# Patient Record
Sex: MALE | Employment: FULL TIME | URBAN - METROPOLITAN AREA
[De-identification: names, ages, dates, MRNs, and addresses within clinical notes are randomized per-mention and may not be internally consistent; named-entity substitution may affect disease eponyms.]

---

## 2019-05-24 ENCOUNTER — HOSPITAL ENCOUNTER (OUTPATIENT)
Age: 59
Discharge: HOME | End: 2019-05-26

## 2019-05-24 LAB
HCT VFR BLD CALC: 40.8 % (ref 37–54)
HEMOGLOBIN: 14.3 G/DL (ref 12.5–16.5)
MCH RBC QN AUTO: 29.9 PG (ref 26–35)
MCHC RBC AUTO-ENTMCNC: 35 % (ref 32–34.5)
MCV RBC AUTO: 85.4 FL (ref 80–99.9)
PDW BLD-RTO: 12.8 FL (ref 11.5–15)
PLATELET # BLD: 317 E9/L (ref 130–450)
PMV BLD AUTO: 9.6 FL (ref 7–12)
RBC # BLD: 4.78 E12/L (ref 3.8–5.8)
WBC # BLD: 5.9 E9/L (ref 4.5–11.5)

## 2019-05-24 PROCEDURE — 85027 COMPLETE CBC AUTOMATED: CPT

## 2019-05-24 PROCEDURE — 88304 TISSUE EXAM BY PATHOLOGIST: CPT

## 2019-11-02 ENCOUNTER — HOSPITAL ENCOUNTER (OUTPATIENT)
Age: 59
Discharge: HOME | End: 2019-11-02
Payer: COMMERCIAL

## 2019-11-02 LAB
ALBUMIN SERPL-MCNC: 4.7 G/DL (ref 3.5–5.2)
ALP BLD-CCNC: 73 U/L (ref 40–129)
ALT SERPL-CCNC: 14 U/L (ref 0–40)
ANION GAP SERPL CALCULATED.3IONS-SCNC: 12 MMOL/L (ref 7–16)
AST SERPL-CCNC: 21 U/L (ref 0–39)
BASOPHILS ABSOLUTE: 0.05 E9/L (ref 0–0.2)
BASOPHILS RELATIVE PERCENT: 0.5 % (ref 0–2)
BILIRUB SERPL-MCNC: 0.2 MG/DL (ref 0–1.2)
BUN BLDV-MCNC: 19 MG/DL (ref 6–20)
CALCIUM SERPL-MCNC: 9.9 MG/DL (ref 8.6–10.2)
CHLORIDE BLD-SCNC: 98 MMOL/L (ref 98–107)
CO2: 28 MMOL/L (ref 22–29)
CREAT SERPL-MCNC: 0.9 MG/DL (ref 0.7–1.2)
EOSINOPHILS ABSOLUTE: 0.13 E9/L (ref 0.05–0.5)
EOSINOPHILS RELATIVE PERCENT: 1.2 % (ref 0–6)
GFR AFRICAN AMERICAN: >60
GFR NON-AFRICAN AMERICAN: >60 ML/MIN/1.73
GLUCOSE BLD-MCNC: 89 MG/DL (ref 74–99)
HCT VFR BLD CALC: 43.2 % (ref 37–54)
HEMOGLOBIN: 14.5 G/DL (ref 12.5–16.5)
IMMATURE GRANULOCYTES #: 0.04 E9/L
IMMATURE GRANULOCYTES %: 0.4 % (ref 0–5)
LYMPHOCYTES ABSOLUTE: 2.54 E9/L (ref 1.5–4)
LYMPHOCYTES RELATIVE PERCENT: 23.8 % (ref 20–42)
MCH RBC QN AUTO: 29.1 PG (ref 26–35)
MCHC RBC AUTO-ENTMCNC: 33.6 % (ref 32–34.5)
MCV RBC AUTO: 86.6 FL (ref 80–99.9)
MONOCYTES ABSOLUTE: 0.66 E9/L (ref 0.1–0.95)
MONOCYTES RELATIVE PERCENT: 6.2 % (ref 2–12)
NEUTROPHILS ABSOLUTE: 7.23 E9/L (ref 1.8–7.3)
NEUTROPHILS RELATIVE PERCENT: 67.9 % (ref 43–80)
PDW BLD-RTO: 13 FL (ref 11.5–15)
PLATELET # BLD: 310 E9/L (ref 130–450)
PMV BLD AUTO: 9.1 FL (ref 7–12)
POTASSIUM SERPL-SCNC: 3.8 MMOL/L (ref 3.5–5)
RBC # BLD: 4.99 E12/L (ref 3.8–5.8)
SODIUM BLD-SCNC: 138 MMOL/L (ref 132–146)
TOTAL PROTEIN: 7.4 G/DL (ref 6.4–8.3)
TSH SERPL DL<=0.05 MIU/L-ACNC: 0.43 UIU/ML (ref 0.27–4.2)
WBC # BLD: 10.7 E9/L (ref 4.5–11.5)

## 2019-11-02 PROCEDURE — 86803 HEPATITIS C AB TEST: CPT

## 2019-11-02 PROCEDURE — 84443 ASSAY THYROID STIM HORMONE: CPT

## 2019-11-02 PROCEDURE — 86703 HIV-1/HIV-2 1 RESULT ANTBDY: CPT

## 2019-11-02 PROCEDURE — 36415 COLL VENOUS BLD VENIPUNCTURE: CPT

## 2019-11-02 PROCEDURE — 80053 COMPREHEN METABOLIC PANEL: CPT

## 2019-11-02 PROCEDURE — 85025 COMPLETE CBC W/AUTO DIFF WBC: CPT

## 2019-11-04 LAB
HEPATITIS C ANTIBODY INTERPRETATION: NORMAL
HIV-1 AND HIV-2 ANTIBODIES: NORMAL

## 2020-11-20 ENCOUNTER — APPOINTMENT (OUTPATIENT)
Dept: GENERAL RADIOLOGY | Age: 60
End: 2020-11-20
Payer: COMMERCIAL

## 2020-11-20 ENCOUNTER — HOSPITAL ENCOUNTER (EMERGENCY)
Age: 60
Discharge: HOME | End: 2020-11-20
Attending: EMERGENCY MEDICINE
Payer: COMMERCIAL

## 2020-11-20 VITALS
WEIGHT: 205 LBS | DIASTOLIC BLOOD PRESSURE: 92 MMHG | OXYGEN SATURATION: 100 % | BODY MASS INDEX: 30.36 KG/M2 | HEIGHT: 69 IN | SYSTOLIC BLOOD PRESSURE: 142 MMHG | TEMPERATURE: 97.5 F | HEART RATE: 73 BPM | RESPIRATION RATE: 16 BRPM

## 2020-11-20 LAB
ALBUMIN SERPL-MCNC: 4.4 G/DL (ref 3.5–5.2)
ALP BLD-CCNC: 78 U/L (ref 40–129)
ALT SERPL-CCNC: 20 U/L (ref 0–40)
ANION GAP SERPL CALCULATED.3IONS-SCNC: 11 MMOL/L (ref 7–16)
AST SERPL-CCNC: 22 U/L (ref 0–39)
BASOPHILS ABSOLUTE: 0 E9/L (ref 0–0.2)
BASOPHILS RELATIVE PERCENT: 0 % (ref 0–2)
BILIRUB SERPL-MCNC: 0.5 MG/DL (ref 0–1.2)
BUN BLDV-MCNC: 17 MG/DL (ref 6–20)
CALCIUM SERPL-MCNC: 9.7 MG/DL (ref 8.6–10.2)
CHLORIDE BLD-SCNC: 101 MMOL/L (ref 98–107)
CO2: 26 MMOL/L (ref 22–29)
CREAT SERPL-MCNC: 1.1 MG/DL (ref 0.7–1.2)
EKG ATRIAL RATE: 70 BPM
EKG P AXIS: 68 DEGREES
EKG P-R INTERVAL: 174 MS
EKG Q-T INTERVAL: 400 MS
EKG QRS DURATION: 98 MS
EKG QTC CALCULATION (BAZETT): 432 MS
EKG R AXIS: -7 DEGREES
EKG T AXIS: 48 DEGREES
EKG VENTRICULAR RATE: 70 BPM
EOSINOPHILS ABSOLUTE: 0 E9/L (ref 0.05–0.5)
EOSINOPHILS RELATIVE PERCENT: 0 % (ref 0–6)
GFR AFRICAN AMERICAN: >60
GFR NON-AFRICAN AMERICAN: >60 ML/MIN/1.73
GLUCOSE BLD-MCNC: 114 MG/DL (ref 74–99)
HCT VFR BLD CALC: 44.2 % (ref 37–54)
HEMOGLOBIN: 14.8 G/DL (ref 12.5–16.5)
IMMATURE GRANULOCYTES #: 0.03 E9/L
IMMATURE GRANULOCYTES %: 0.5 % (ref 0–5)
LIPASE: 14 U/L (ref 13–60)
LYMPHOCYTES ABSOLUTE: 1.63 E9/L (ref 1.5–4)
LYMPHOCYTES RELATIVE PERCENT: 24.5 % (ref 20–42)
MCH RBC QN AUTO: 28.7 PG (ref 26–35)
MCHC RBC AUTO-ENTMCNC: 33.5 % (ref 32–34.5)
MCV RBC AUTO: 85.8 FL (ref 80–99.9)
MONOCYTES ABSOLUTE: 0.43 E9/L (ref 0.1–0.95)
MONOCYTES RELATIVE PERCENT: 6.5 % (ref 2–12)
NEUTROPHILS ABSOLUTE: 4.56 E9/L (ref 1.8–7.3)
NEUTROPHILS RELATIVE PERCENT: 68.5 % (ref 43–80)
PDW BLD-RTO: 12.4 FL (ref 11.5–15)
PLATELET # BLD: 262 E9/L (ref 130–450)
PMV BLD AUTO: 8.8 FL (ref 7–12)
POTASSIUM REFLEX MAGNESIUM: 4.1 MMOL/L (ref 3.5–5)
RBC # BLD: 5.15 E12/L (ref 3.8–5.8)
SODIUM BLD-SCNC: 138 MMOL/L (ref 132–146)
TOTAL PROTEIN: 7.2 G/DL (ref 6.4–8.3)
TROPONIN: <0.01 NG/ML (ref 0–0.03)
TROPONIN: <0.01 NG/ML (ref 0–0.03)
WBC # BLD: 6.7 E9/L (ref 4.5–11.5)

## 2020-11-20 PROCEDURE — 74022 RADEX COMPL AQT ABD SERIES: CPT

## 2020-11-20 PROCEDURE — 93005 ELECTROCARDIOGRAM TRACING: CPT | Performed by: STUDENT IN AN ORGANIZED HEALTH CARE EDUCATION/TRAINING PROGRAM

## 2020-11-20 PROCEDURE — 99284 EMERGENCY DEPT VISIT MOD MDM: CPT

## 2020-11-20 PROCEDURE — 2580000003 HC RX 258: Performed by: STUDENT IN AN ORGANIZED HEALTH CARE EDUCATION/TRAINING PROGRAM

## 2020-11-20 PROCEDURE — 85025 COMPLETE CBC W/AUTO DIFF WBC: CPT

## 2020-11-20 PROCEDURE — 6360000002 HC RX W HCPCS: Performed by: STUDENT IN AN ORGANIZED HEALTH CARE EDUCATION/TRAINING PROGRAM

## 2020-11-20 PROCEDURE — 84484 ASSAY OF TROPONIN QUANT: CPT

## 2020-11-20 PROCEDURE — 96374 THER/PROPH/DIAG INJ IV PUSH: CPT

## 2020-11-20 PROCEDURE — 6370000000 HC RX 637 (ALT 250 FOR IP): Performed by: STUDENT IN AN ORGANIZED HEALTH CARE EDUCATION/TRAINING PROGRAM

## 2020-11-20 PROCEDURE — C9113 INJ PANTOPRAZOLE SODIUM, VIA: HCPCS | Performed by: STUDENT IN AN ORGANIZED HEALTH CARE EDUCATION/TRAINING PROGRAM

## 2020-11-20 PROCEDURE — 83690 ASSAY OF LIPASE: CPT

## 2020-11-20 PROCEDURE — 80053 COMPREHEN METABOLIC PANEL: CPT

## 2020-11-20 PROCEDURE — 93010 ELECTROCARDIOGRAM REPORT: CPT | Performed by: INTERNAL MEDICINE

## 2020-11-20 RX ORDER — 0.9 % SODIUM CHLORIDE 0.9 %
1000 INTRAVENOUS SOLUTION INTRAVENOUS ONCE
Status: COMPLETED | OUTPATIENT
Start: 2020-11-20 | End: 2020-11-20

## 2020-11-20 RX ORDER — FLUOXETINE HYDROCHLORIDE 40 MG/1
40 CAPSULE ORAL 2 TIMES DAILY
COMMUNITY
End: 2021-12-16 | Stop reason: ALTCHOICE

## 2020-11-20 RX ORDER — LAMOTRIGINE 200 MG/1
300 TABLET ORAL DAILY
COMMUNITY

## 2020-11-20 RX ORDER — HYDROXYZINE HYDROCHLORIDE 10 MG/1
50 TABLET, FILM COATED ORAL ONCE
Status: COMPLETED | OUTPATIENT
Start: 2020-11-20 | End: 2020-11-20

## 2020-11-20 RX ORDER — LORAZEPAM 1 MG/1
1 TABLET ORAL ONCE
Status: COMPLETED | OUTPATIENT
Start: 2020-11-20 | End: 2020-11-20

## 2020-11-20 RX ORDER — FAMOTIDINE 20 MG/1
20 TABLET, FILM COATED ORAL 2 TIMES DAILY
Qty: 14 TABLET | Refills: 0 | Status: SHIPPED | OUTPATIENT
Start: 2020-11-20 | End: 2021-12-16 | Stop reason: ALTCHOICE

## 2020-11-20 RX ORDER — CLONAZEPAM 1 MG/1
1 TABLET ORAL NIGHTLY PRN
COMMUNITY

## 2020-11-20 RX ORDER — HYDROXYZINE 50 MG/1
50 TABLET, FILM COATED ORAL EVERY 8 HOURS PRN
Qty: 30 TABLET | Refills: 0 | Status: SHIPPED | OUTPATIENT
Start: 2020-11-20 | End: 2020-11-30

## 2020-11-20 RX ORDER — PANTOPRAZOLE SODIUM 40 MG/10ML
40 INJECTION, POWDER, LYOPHILIZED, FOR SOLUTION INTRAVENOUS ONCE
Status: COMPLETED | OUTPATIENT
Start: 2020-11-20 | End: 2020-11-20

## 2020-11-20 RX ADMIN — SODIUM CHLORIDE 1000 ML: 9 INJECTION, SOLUTION INTRAVENOUS at 09:16

## 2020-11-20 RX ADMIN — LORAZEPAM 1 MG: 1 TABLET ORAL at 10:28

## 2020-11-20 RX ADMIN — PANTOPRAZOLE SODIUM 40 MG: 40 INJECTION, POWDER, FOR SOLUTION INTRAVENOUS at 09:15

## 2020-11-20 RX ADMIN — HYDROXYZINE HYDROCHLORIDE 50 MG: 10 TABLET ORAL at 11:13

## 2020-11-20 RX ADMIN — LIDOCAINE HYDROCHLORIDE: 20 SOLUTION ORAL; TOPICAL at 09:07

## 2020-11-20 ASSESSMENT — PAIN SCALES - GENERAL: PAINLEVEL_OUTOF10: 7

## 2020-11-20 ASSESSMENT — PAIN DESCRIPTION - LOCATION: LOCATION: ABDOMEN

## 2020-11-20 ASSESSMENT — PAIN DESCRIPTION - DESCRIPTORS: DESCRIPTORS: BURNING

## 2020-11-20 NOTE — ED PROVIDER NOTES
Patient is a 62-year-old male with a history of clinical depression and cholecystectomy in the past who presents to the emergency department complaining of worsening abdominal pain over the past 5 days. Patient states that he has been experiencing mid epigastric abdominal pain that radiates up into the center of his chest.  He states that it is better when he stands up, and it is worse when lying flat. He states that it is a burning sensation. The patient did not take anything for his symptoms. Patient denies any cardiac history, cough, shortness of breath, fever, chills, nausea, vomiting, dizziness, lightheadedness, syncope, history of similar symptoms in the past, or other acute symptoms or concerns. The history is provided by the patient. Review of Systems   Constitutional: Negative for chills, fatigue and fever. HENT: Negative for congestion and voice change. Eyes: Negative for photophobia and visual disturbance. Respiratory: Negative for cough and shortness of breath. Cardiovascular: Positive for chest pain. Negative for palpitations and leg swelling. Gastrointestinal: Positive for abdominal pain. Negative for diarrhea, nausea and vomiting. Genitourinary: Negative for dysuria, flank pain and frequency. Musculoskeletal: Negative for back pain and neck pain. Skin: Negative for rash and wound. Neurological: Negative for dizziness, syncope, weakness, light-headedness and headaches. All other systems reviewed and are negative. Physical Exam  Vitals signs and nursing note reviewed. Constitutional:       General: He is not in acute distress. Appearance: He is well-developed. He is not ill-appearing, toxic-appearing or diaphoretic. HENT:      Head: Normocephalic and atraumatic. Eyes:      Pupils: Pupils are equal, round, and reactive to light. Neck:      Musculoskeletal: Normal range of motion and neck supple.    Cardiovascular:      Rate and Rhythm: Normal rate and regular rhythm. Heart sounds: Normal heart sounds, S1 normal and S2 normal. No murmur. Pulmonary:      Effort: Pulmonary effort is normal. No respiratory distress. Breath sounds: Normal breath sounds and air entry. No decreased breath sounds, wheezing, rhonchi or rales. Abdominal:      General: Bowel sounds are normal.      Palpations: Abdomen is soft. Tenderness: There is no abdominal tenderness. There is no guarding or rebound. Skin:     General: Skin is warm and dry. Neurological:      Mental Status: He is alert and oriented to person, place, and time. Motor: No tremor or abnormal muscle tone. Coordination: Coordination normal.   Psychiatric:         Mood and Affect: Mood is anxious. Procedures     MDM  Number of Diagnoses or Management Options  Epigastric pain:   Diagnosis management comments: The patient is a 53-year-old male who presents the emergency department complaining of epigastric abdominal pain and chest pain. He is hemodynamically stable, nontoxic in appearance, in no acute distress. The patient is very anxious and paces around the room intermittently. EKG did not show any ischemic changes, troponin negative, delta troponin negative, chest x-ray within normal limits, no leukocytosis, no anemia, no electrolyte abnormalities, lipase normal.  Treated patient with 1 L of IV fluids, GI cocktail, and Protonix. He states that he was feeling better but was still very anxious about his symptoms. Did provide 1 mg of Ativan, he was still complaining of some pain, did provide a dose of Atarax. The patient states that he did get some relief and then his symptoms have improved. Recommended patient to follow-up with his family doctor. Low risk heart score of 2. Patient is instructed to return here if he experiences any significant worsening symptoms or other acute symptoms or concerns.   Patient symptoms seem to be more GI related as this epigastric pain that he describes as a burning sensation radiating into his chest, and also a component of his anxiety contributing to his symptoms. Patient verbalized understanding and agreement to treatment plan and discharge instructions. EKG:  This EKG is signed and interpreted by me. Rate: 70  Rhythm: Sinus  Interpretation: Normal sinus rhythm, normal axis, no ST elevation or depressions, intervals within normal limits, QTC is 432  Comparison: no previous EKG available            --------------------------------------------- PAST HISTORY ---------------------------------------------  Past Medical History:  has a past medical history of Clinical depression and Depression. Past Surgical History:  has a past surgical history that includes Cholecystectomy and knee surgery (Left, 2000). Social History:  reports that he has never smoked. He has never used smokeless tobacco. He reports previous alcohol use. He reports that he does not use drugs. Family History: family history is not on file. The patients home medications have been reviewed.     Allergies: Sulfa antibiotics    -------------------------------------------------- RESULTS -------------------------------------------------  Labs:  Results for orders placed or performed during the hospital encounter of 11/20/20   CBC auto differential   Result Value Ref Range    WBC 6.7 4.5 - 11.5 E9/L    RBC 5.15 3.80 - 5.80 E12/L    Hemoglobin 14.8 12.5 - 16.5 g/dL    Hematocrit 44.2 37.0 - 54.0 %    MCV 85.8 80.0 - 99.9 fL    MCH 28.7 26.0 - 35.0 pg    MCHC 33.5 32.0 - 34.5 %    RDW 12.4 11.5 - 15.0 fL    Platelets 071 461 - 091 E9/L    MPV 8.8 7.0 - 12.0 fL    Neutrophils % 68.5 43.0 - 80.0 %    Immature Granulocytes % 0.5 0.0 - 5.0 %    Lymphocytes % 24.5 20.0 - 42.0 %    Monocytes % 6.5 2.0 - 12.0 %    Eosinophils % 0.0 0.0 - 6.0 %    Basophils % 0.0 0.0 - 2.0 %    Neutrophils Absolute 4.56 1.80 - 7.30 E9/L    Immature Granulocytes # 0.03 E9/L    Lymphocytes have spoken with the patient and discussed todays results, in addition to providing specific details for the plan of care and counseling regarding the diagnosis and prognosis. Their questions are answered at this time and they are agreeable with the plan. I discussed at length with them reasons for immediate return here for re evaluation. They will followup with their primary care physician by calling their office tomorrow. --------------------------------- ADDITIONAL PROVIDER NOTES ---------------------------------  At this time the patient is without objective evidence of an acute process requiring hospitalization or inpatient management. They have remained hemodynamically stable throughout their entire ED visit and are stable for discharge with outpatient follow-up. The plan has been discussed in detail and they are aware of the specific conditions for emergent return, as well as the importance of follow-up. Discharge Medication List as of 11/20/2020 10:10 AM      START taking these medications    Details   famotidine (PEPCID) 20 MG tablet Take 1 tablet by mouth 2 times daily for 7 days, Disp-14 tablet,R-0Print             Diagnosis:  1. Epigastric pain        Disposition:  Patient's disposition: Discharge to home  Patient's condition is stable.        Chaka Mota,   Resident  11/21/20 [id number]

## 2020-11-20 NOTE — ED NOTES
Pt states burning in chest still present. Dr Wray Sorrel notified.      Shawnee Greene, RN  11/20/20 8411

## 2020-11-21 ASSESSMENT — ENCOUNTER SYMPTOMS
VOICE CHANGE: 0
BACK PAIN: 0
VOMITING: 0
COUGH: 0
ABDOMINAL PAIN: 1
PHOTOPHOBIA: 0
SHORTNESS OF BREATH: 0
NAUSEA: 0
DIARRHEA: 0

## 2021-11-10 ENCOUNTER — TELEPHONE (OUTPATIENT)
Dept: ORTHOPEDIC SURGERY | Age: 61
End: 2021-11-10

## 2021-11-10 NOTE — TELEPHONE ENCOUNTER
Call placed to pt to schedule NP referral for R knee pain. No answer, VM left with callback number. Pt can be added to Dr. Ludger Mortimer schedule in [address] [facility] on 11/30 should he return call.

## 2021-11-10 NOTE — TELEPHONE ENCOUNTER
Pt returned phone call, he is scheduled with Dr. Jorge Luis Mccartney at the [facility] office on 11/30. When I verified his appt with him, he stated he is suppose to be scheduled with Dr. Windy Nicole, he would prefer Dr. Windy Nicole since that is who his PCP recommended. He is going keep the appt with Dr. Jorge Luis Mccartney until he hears from the office. Shon Leon can be reached at [phone].

## 2021-11-15 NOTE — TELEPHONE ENCOUNTER
Call placed to patient, VM left, appointment moved to 12/7 with SHAILA. Directions to office and phone number to confirm appointment provided.

## 2021-11-15 NOTE — TELEPHONE ENCOUNTER
Pt called back, he received the previous message and confirmed his 12-07-21 appt with Dr Rosy Goldmann.

## 2021-12-03 DIAGNOSIS — M25.561 RIGHT KNEE PAIN, UNSPECIFIED CHRONICITY: Primary | ICD-10-CM

## 2021-12-07 ENCOUNTER — OFFICE VISIT (OUTPATIENT)
Dept: ORTHOPEDIC SURGERY | Age: 61
End: 2021-12-07
Payer: COMMERCIAL

## 2021-12-07 VITALS — TEMPERATURE: 97.6 F

## 2021-12-07 DIAGNOSIS — M17.11 OSTEOARTHRITIS OF RIGHT KNEE, UNSPECIFIED OSTEOARTHRITIS TYPE: Primary | ICD-10-CM

## 2021-12-07 PROCEDURE — 99203 OFFICE O/P NEW LOW 30 MIN: CPT | Performed by: PHYSICIAN ASSISTANT

## 2021-12-07 NOTE — PROGRESS NOTES
He has been using topical steroidal creams. States his R knee feels stiff, but edema has significantly improved over the past several months. No prior surgical hx to D[facility]Y LE. Review of Systems   Constitutional: Negative for fever, chills, diaphoresis, appetite change and fatigue. HENT: Negative for dental issues, hearing loss and tinnitus. Negative for congestion, sinus pressure, sneezing, sore throat. Negative for headache. Eyes: Negative for visual disturbance, blurred and double vision. Negative for pain, discharge, redness and itching  Respiratory: Negative for cough, shortness of breath and wheezing. Cardiovascular: Negative for chest pain, palpitations and leg swelling. No dyspnea on exertion   Gastrointestinal:   Negative for nausea, vomiting, abdominal pain, diarrhea, constipation  or black or bloody. Hematologic\Lymphatic:  negative for bleeding, petechiae,   Genitourinary: Negative for hematuria and difficulty urinating. Musculoskeletal: Negative for neck pain and stiffness. Negative for back pain, see HPI  Skin: Negative for pallor, rash and wound. Neurological: Negative for dizziness, tremors, seizures, weakness, light-headedness, no TIA or stroke symptoms. No numbness and headaches. Psychiatric/Behavioral: Negative. OBJECTIVE:      Physical Examination:   General appearance: alert, well appearing, and in no distress,  normal appearing weight.  No visible signs of trauma   Mental status: alert, oriented to person, place, and time, normal mood, behavior, speech, dress, motor activity, and thought processes  Abdomen: soft, nondistended  Resp:   resp easy and unlabored, no audible wheezes note, normal symmetrical expansion of both hemithoraces  Cardiac: distal pulses palpable, skin and extremities well perfused  Neurological: alert, oriented X3, normal speech, no focal findings or movement disorder noted, motor and sensory grossly normal bilaterally, normal muscle tone, no tremors, strength 5/5, normal gait and station  HEENT: normochephalic atraumatic, external ears and eyes normal, sclera normal, neck supple, no nasal discharge. Extremities:   peripheral pulses normal, no edema, redness or tenderness in the calves   Skin: normal coloration, no rashes or open wounds, no suspicious skin lesions noted  Psych: Affect euthymic   Musculoskeletal:   Extremity:  Right Lower Extremity  Skin clean dry and intact, without signs of infection  No effusion or edema note   Compartments supple throughout thigh and leg  Calf supple and nontender  Demonstrates active knee flexion/extension, ankle plantar/dorsiflexion/great toe extension. States sensation intact to touch in sural/deep peroneal/superficial peroneal/saphenous/posterior tibial nerve distributions to foot/ankle. Palpable dorsalis pedis and posterior tibialis pulses, cap refill brisk in toes, foot warm/perfused. lachman and posterior drawer neg  justo neg  Stable to varus and valgus at 0 and 30  Mild lateral joint line TTP R knee   nontender to PROM R hip                Temp 97.6 °F (36.4 °C) (Oral)      XR: 12/7/21     Narrative   EXAMINATION:   THREE XRAY VIEWS OF THE RIGHT KNEE       12/7/2021 9:38 am       COMPARISON:   None.       HISTORY:   ORDERING SYSTEM PROVIDED HISTORY: Right knee pain, unspecified chronicity   TECHNOLOGIST PROVIDED HISTORY:   Reason for exam:->knee pain       FINDINGS:   Mild osteoarthritis primarily at the lateral weight-bearing and   patellofemoral compartments.  Very small joint effusion.  No fracture or   dislocation.               ASSESSMENT:     Diagnosis Orders   1. Osteoarthritis of right knee, unspecified osteoarthritis type         Discussion: Had lengthy discussion with patient regarding His diagnosis, typical prognosis, and expected outcomes. I reviewed the possible complications from the injury itself despite treatment choosen.  I also discussed treatment options including nonoperative managements versus surgical management, along with risks and benefits of each. They have elected for conservative management at this time. PLAN:  WBAT R LE   otc analgesics PRN  Advised against prolonged topical steroid use due to possible skin atrophy  Call if wishing to pursue outpatient PT  Evaluated with Dr. Sal Villa     F/u PRN    Electronically signed by Jolynn Muñoz PA-C on 12/7/2021 at 11:14 AM  Note: This report was completed using [employer] voiced recognition software. Every effort has been made to ensure accuracy; however, inadvertent computerized transcription errors may be present.

## 2021-12-07 NOTE — PROGRESS NOTES
Second opinion. Seeing Dr. Adolfo Gordillo previously and was receiving CSI injections, last round about 6-8 weeks ago. States they helped, but very minimally.     Patient states this past spring he missed a soccer ball and landed on his

## 2021-12-16 ENCOUNTER — OFFICE VISIT (OUTPATIENT)
Dept: FAMILY MEDICINE CLINIC | Age: 61
End: 2021-12-16
Payer: COMMERCIAL

## 2021-12-16 VITALS
WEIGHT: 209 LBS | OXYGEN SATURATION: 97 % | HEIGHT: 67 IN | BODY MASS INDEX: 32.8 KG/M2 | TEMPERATURE: 97.8 F | SYSTOLIC BLOOD PRESSURE: 136 MMHG | HEART RATE: 109 BPM | DIASTOLIC BLOOD PRESSURE: 78 MMHG

## 2021-12-16 DIAGNOSIS — Z20.822 CLOSE EXPOSURE TO COVID-19 VIRUS: Primary | ICD-10-CM

## 2021-12-16 LAB
Lab: NORMAL
QC PASS/FAIL: NORMAL
SARS-COV-2 RDRP RESP QL NAA+PROBE: NEGATIVE

## 2021-12-16 PROCEDURE — 99203 OFFICE O/P NEW LOW 30 MIN: CPT | Performed by: PHYSICIAN ASSISTANT

## 2021-12-16 PROCEDURE — 87635 SARS-COV-2 COVID-19 AMP PRB: CPT | Performed by: PHYSICIAN ASSISTANT

## 2021-12-16 NOTE — PROGRESS NOTES
21  Tevin Gonzalez : 1960 Sex: male  Age 63 y.o. Subjective:  Chief Complaint   Patient presents with    Other     Exposed to covid. HPI:   Tevin Gonzalez , 63 y.o. male presents to [facility] care for evaluation of exposure to Covid    HPI  61-year-old male presents to [facility] for evaluation of possible Covid. The patient was exposed to daughter who tested positive for Covid yesterday. The patient is not having any symptoms at this time. He is scheduled to have ventral hernia repairs done tomorrow. The patient was told to come in and get rapid testing done. The patient is up-to-date on his vaccinations. ROS:   Unless otherwise stated in this report the patient's positive and negative responses for review of systems for constitutional, eyes, ENT, cardiovascular, respiratory, gastrointestinal, neurological, , musculoskeletal, and integument systems and related systems to the presenting problem are either stated in the history of present illness or were not pertinent or were negative for the symptoms and/or complaints related to the presenting medical problem. Positives and pertinent negatives as per HPI. All others reviewed and are negative. PMH:     Past Medical History:   Diagnosis Date    Clinical depression     Depression        Past Surgical History:   Procedure Laterality Date    CHOLECYSTECTOMY      KNEE SURGERY Left        History reviewed. No pertinent family history. Medications:     Current Outpatient Medications:     lamoTRIgine (LAMICTAL) 200 MG tablet, Take 300 mg by mouth daily, Disp: , Rfl:     clonazePAM (KLONOPIN) 1 MG tablet, Take 1 mg by mouth nightly as needed. , Disp: , Rfl:     Allergies:      Allergies   Allergen Reactions    Sulfa Antibiotics        Social History:     Social History     Tobacco Use    Smoking status: Never Smoker    Smokeless tobacco: Never Used   Substance Use Topics    Alcohol use: Not Currently    exposure to COVID-19 virus  -     COVID-19 Ambulatory; Future  -     COVID-19 Ambulatory        The patient is to call for any concerns or return if any of the signs or symptoms worsen. The patient is to follow-up with PCP in the next 2-3 days for repeat evaluation repeat assessment or go directly to the emergency department.      SIGNATURE: Bere Gore III, PA-C

## 2021-12-18 LAB
SARS-COV-2: NOT DETECTED
SOURCE: NORMAL

## 2023-04-19 ENCOUNTER — OFFICE VISIT (OUTPATIENT)
Dept: FAMILY MEDICINE CLINIC | Age: 63
End: 2023-04-19
Payer: COMMERCIAL

## 2023-04-19 VITALS
OXYGEN SATURATION: 97 % | HEIGHT: 69 IN | WEIGHT: 211 LBS | DIASTOLIC BLOOD PRESSURE: 68 MMHG | HEART RATE: 84 BPM | BODY MASS INDEX: 31.25 KG/M2 | SYSTOLIC BLOOD PRESSURE: 120 MMHG | TEMPERATURE: 98.2 F | RESPIRATION RATE: 18 BRPM

## 2023-04-19 DIAGNOSIS — R09.81 NASAL CONGESTION: ICD-10-CM

## 2023-04-19 DIAGNOSIS — J02.0 STREP PHARYNGITIS: Primary | ICD-10-CM

## 2023-04-19 DIAGNOSIS — R09.82 POSTNASAL DRIP: ICD-10-CM

## 2023-04-19 DIAGNOSIS — R07.0 PAIN IN THROAT: ICD-10-CM

## 2023-04-19 LAB — S PYO AG THROAT QL: POSITIVE

## 2023-04-19 PROCEDURE — 99213 OFFICE O/P EST LOW 20 MIN: CPT | Performed by: PHYSICIAN ASSISTANT

## 2023-04-19 RX ORDER — AMOXICILLIN AND CLAVULANATE POTASSIUM 875; 125 MG/1; MG/1
1 TABLET, FILM COATED ORAL 2 TIMES DAILY
Qty: 20 TABLET | Refills: 0 | Status: SHIPPED | OUTPATIENT
Start: 2023-04-19 | End: 2023-04-29

## 2023-04-19 RX ORDER — METHYLPREDNISOLONE 4 MG/1
TABLET ORAL
Qty: 1 KIT | Refills: 0 | Status: SHIPPED | OUTPATIENT
Start: 2023-04-19

## 2023-04-19 NOTE — PROGRESS NOTES
medical history reviewed. Allergies reviewed. Medications reviewed. Patient on arrival does not appear to be in any apparent distress or discomfort. The patient has been seen and evaluated. The patient does not appear to be toxic or lethargic. The patient's rapid strep was positive. We will treat the patient with Augmentin and Medrol Dosepak. The patient was educated on the proper dosage of motrin and tylenol and the appropriate intervals of each. The patient is to increase fluid intake over the next several days. The patient is to use OTC decongestant as needed. The patient is to return to express care or go directly to the emergency department should any of the signs or symptoms worsen. The patient is to followup with primary care physician in 2-3 days for repeat evaluation. The patient has no other questions or concerns at this time the patient will be discharged home. Clinical Impression:   Sandro Bello was seen today for pharyngitis, otalgia and cough. Diagnoses and all orders for this visit:    Strep pharyngitis    Pain in throat  -     POCT rapid strep A    Nasal congestion    Postnasal drip    Other orders  -     amoxicillin-clavulanate (AUGMENTIN) 875-125 MG per tablet; Take 1 tablet by mouth 2 times daily for 10 days  -     methylPREDNISolone (MEDROL DOSEPACK) 4 MG tablet; Take by mouth. The patient is to call for any concerns or return if any of the signs or symptoms worsen. The patient is to follow-up with PCP in the next 2-3 days for repeat evaluation repeat assessment or go directly to the emergency department.      SIGNATURE: Raphael Ramos III, PA-C

## 2023-04-29 ENCOUNTER — OFFICE VISIT (OUTPATIENT)
Dept: FAMILY MEDICINE CLINIC | Age: 63
End: 2023-04-29
Payer: COMMERCIAL

## 2023-04-29 VITALS
BODY MASS INDEX: 31.7 KG/M2 | WEIGHT: 214 LBS | OXYGEN SATURATION: 98 % | HEART RATE: 79 BPM | RESPIRATION RATE: 16 BRPM | SYSTOLIC BLOOD PRESSURE: 128 MMHG | DIASTOLIC BLOOD PRESSURE: 74 MMHG | HEIGHT: 69 IN | TEMPERATURE: 97.5 F

## 2023-04-29 DIAGNOSIS — J02.0 ACUTE STREPTOCOCCAL PHARYNGITIS: ICD-10-CM

## 2023-04-29 DIAGNOSIS — J02.9 SORE THROAT: Primary | ICD-10-CM

## 2023-04-29 LAB — S PYO AG THROAT QL: POSITIVE

## 2023-04-29 PROCEDURE — 99213 OFFICE O/P EST LOW 20 MIN: CPT | Performed by: FAMILY MEDICINE

## 2023-04-29 PROCEDURE — 87880 STREP A ASSAY W/OPTIC: CPT | Performed by: FAMILY MEDICINE

## 2023-04-29 RX ORDER — LEVOFLOXACIN 500 MG/1
500 TABLET, FILM COATED ORAL DAILY
Qty: 7 TABLET | Refills: 0 | Status: SHIPPED | OUTPATIENT
Start: 2023-04-29 | End: 2023-05-06

## 2023-04-29 NOTE — PROGRESS NOTES
Merdis Babinski (:  1960) is a 63 y.o. male,Established patient, here for evaluation of the following chief complaint(s):  Pharyngitis (Throat still feels swollen, just finished antibiotic  today and in the last day or so sore throat, productive cough, ears hurt. )         ASSESSMENT/PLAN:  1. Sore throat  Comments:  rapid strep still positive  Orders:  -     POCT rapid strep A  2. Acute streptococcal pharyngitis  Comments:  recurrent  levaquin and needs to clean cpap twice in the next week to no reinfect himself      Return if symptoms worsen or fail to improve. Subjective   SUBJECTIVE/OBJECTIVE:  Here with recureent sore throat and ear pain  Strep positive on 4-19  Wear cpap and never cleaned it after diagnosed with strep  No fevers  He finished augmentin yeterday morning  And today feels worse again  A lot of drainage too  No sneezing, no coughing      Review of Systems   Constitutional:  Positive for fatigue. Negative for appetite change and fever. HENT:  Positive for congestion, postnasal drip, sinus pressure and sore throat. Negative for ear pain, hearing loss and sinus pain. Eyes:  Negative for pain. Respiratory:  Negative for cough, shortness of breath and wheezing. Cardiovascular:  Negative for chest pain and leg swelling. Gastrointestinal:  Negative for abdominal pain, constipation, diarrhea, nausea and vomiting. Endocrine: Negative for cold intolerance and heat intolerance. Genitourinary:  Negative for difficulty urinating, hematuria, scrotal swelling, testicular pain and urgency. Musculoskeletal:  Negative for arthralgias, back pain, joint swelling and myalgias. Skin:  Negative for rash and wound. Neurological:  Negative for dizziness, syncope and light-headedness. Hematological:  Negative for adenopathy. Psychiatric/Behavioral:  Negative for confusion and sleep disturbance. The patient is not nervous/anxious.          Objective   Physical Exam  Vitals and

## 2023-04-30 ASSESSMENT — ENCOUNTER SYMPTOMS
WHEEZING: 0
BACK PAIN: 0
SORE THROAT: 1
CONSTIPATION: 0
COUGH: 0
SINUS PRESSURE: 1
VOMITING: 0
SINUS PAIN: 0
DIARRHEA: 0
ABDOMINAL PAIN: 0
NAUSEA: 0
SHORTNESS OF BREATH: 0
EYE PAIN: 0

## 2023-05-26 ENCOUNTER — OFFICE VISIT (OUTPATIENT)
Dept: FAMILY MEDICINE CLINIC | Age: 63
End: 2023-05-26

## 2023-05-26 VITALS
HEART RATE: 76 BPM | BODY MASS INDEX: 31.55 KG/M2 | TEMPERATURE: 98.6 F | SYSTOLIC BLOOD PRESSURE: 110 MMHG | OXYGEN SATURATION: 97 % | HEIGHT: 69 IN | DIASTOLIC BLOOD PRESSURE: 64 MMHG | WEIGHT: 213 LBS

## 2023-05-26 DIAGNOSIS — R39.9 UTI SYMPTOMS: ICD-10-CM

## 2023-05-26 DIAGNOSIS — R30.0 DYSURIA: ICD-10-CM

## 2023-05-26 DIAGNOSIS — R30.0 DYSURIA: Primary | ICD-10-CM

## 2023-05-26 LAB
BILIRUBIN, POC: NORMAL
BLOOD URINE, POC: NORMAL
CLARITY, POC: CLEAR
COLOR, POC: YELLOW
GLUCOSE URINE, POC: NORMAL
KETONES, POC: NORMAL
LEUKOCYTE EST, POC: NORMAL
NITRITE, POC: NORMAL
PH, POC: 7
PROTEIN, POC: NORMAL
SPECIFIC GRAVITY, POC: 1.01
UROBILINOGEN, POC: 0.2

## 2023-05-26 RX ORDER — CIPROFLOXACIN 500 MG/1
500 TABLET, FILM COATED ORAL 2 TIMES DAILY
Qty: 20 TABLET | Refills: 0 | Status: SHIPPED | OUTPATIENT
Start: 2023-05-26 | End: 2023-06-05

## 2023-05-26 RX ORDER — PHENAZOPYRIDINE HYDROCHLORIDE 200 MG/1
200 TABLET, FILM COATED ORAL 3 TIMES DAILY PRN
Qty: 9 TABLET | Refills: 0 | Status: SHIPPED | OUTPATIENT
Start: 2023-05-26 | End: 2023-05-29

## 2023-05-26 NOTE — PROGRESS NOTES
23  Prakash Singer : 1960 Sex: male  Age 63 y.o. Subjective:  Chief Complaint   Patient presents with    Dysuria     Pt states that this started 5 days ago having frequency and dysuria. Pt states that he was on antibiotic for strep 2 weeks ago. HPI:   Prakash Singer , 63 y.o. male presents to express care for evaluation of possible UTI. The patient has noted the symptoms over the last 5 days. The patient has had some increased frequency, urgency and burning with urination. The patient states that he had some chills but no fevers. No side pain, back pain. The patient is not having any abdominal discomfort. The patient is not having any urethral drainage or discharge. Denies chance of STD. The patient does not get UTIs frequently. He states that he had a PSA about 1 year ago and was within normal limits. ROS:   Unless otherwise stated in this report the patient's positive and negative responses for review of systems for constitutional, eyes, ENT, cardiovascular, respiratory, gastrointestinal, neurological, , musculoskeletal, and integument systems and related systems to the presenting problem are either stated in the history of present illness or were not pertinent or were negative for the symptoms and/or complaints related to the presenting medical problem. Positives and pertinent negatives as per HPI. All others reviewed and are negative. PMH:     Past Medical History:   Diagnosis Date    Clinical depression     Depression        Past Surgical History:   Procedure Laterality Date    CHOLECYSTECTOMY      KNEE SURGERY Left        History reviewed. No pertinent family history.     Medications:     Current Outpatient Medications:     ciprofloxacin (CIPRO) 500 MG tablet, Take 1 tablet by mouth 2 times daily for 10 days, Disp: 20 tablet, Rfl: 0    phenazopyridine (PYRIDIUM) 200 MG tablet, Take 1 tablet by mouth 3 times daily as needed for Pain, Disp: 9

## 2023-11-09 ENCOUNTER — OFFICE VISIT (OUTPATIENT)
Dept: FAMILY MEDICINE CLINIC | Age: 63
End: 2023-11-09

## 2023-11-09 VITALS
OXYGEN SATURATION: 97 % | SYSTOLIC BLOOD PRESSURE: 138 MMHG | BODY MASS INDEX: 30.96 KG/M2 | HEART RATE: 76 BPM | HEIGHT: 69 IN | TEMPERATURE: 97.7 F | DIASTOLIC BLOOD PRESSURE: 82 MMHG | RESPIRATION RATE: 18 BRPM | WEIGHT: 209 LBS

## 2023-11-09 DIAGNOSIS — R05.1 ACUTE COUGH: ICD-10-CM

## 2023-11-09 DIAGNOSIS — R07.0 PAIN IN THROAT: Primary | ICD-10-CM

## 2023-11-09 DIAGNOSIS — J02.0 STREP PHARYNGITIS: ICD-10-CM

## 2023-11-09 DIAGNOSIS — R09.81 NASAL CONGESTION: ICD-10-CM

## 2023-11-09 LAB
ALBUMIN SERPL-MCNC: 4.7 G/DL (ref 3.5–5.2)
ALP SERPL-CCNC: 87 U/L (ref 40–129)
ALT SERPL-CCNC: 13 U/L (ref 0–40)
ANION GAP SERPL CALCULATED.3IONS-SCNC: 8 MMOL/L (ref 7–16)
AST SERPL-CCNC: 19 U/L (ref 0–39)
BILIRUB SERPL-MCNC: 0.7 MG/DL (ref 0–1.2)
BUN SERPL-MCNC: 21 MG/DL (ref 6–23)
CALCIUM SERPL-MCNC: 10 MG/DL (ref 8.6–10.2)
CHLORIDE SERPL-SCNC: 101 MMOL/L (ref 98–107)
CO2 SERPL-SCNC: 29 MMOL/L (ref 22–29)
CREAT SERPL-MCNC: 1.1 MG/DL (ref 0.7–1.2)
GFR SERPL CREATININE-BSD FRML MDRD: >60 ML/MIN/1.73M2
GLUCOSE SERPL-MCNC: 100 MG/DL (ref 74–99)
POTASSIUM SERPL-SCNC: 4.3 MMOL/L (ref 3.5–5)
PROT SERPL-MCNC: 7.4 G/DL (ref 6.4–8.3)
S PYO AG THROAT QL: POSITIVE
SODIUM SERPL-SCNC: 138 MMOL/L (ref 132–146)

## 2023-11-09 RX ORDER — AMOXICILLIN AND CLAVULANATE POTASSIUM 875; 125 MG/1; MG/1
1 TABLET, FILM COATED ORAL 2 TIMES DAILY
Qty: 20 TABLET | Refills: 0 | Status: SHIPPED | OUTPATIENT
Start: 2023-11-09 | End: 2023-11-19

## 2023-11-09 RX ORDER — BROMPHENIRAMINE MALEATE, PSEUDOEPHEDRINE HYDROCHLORIDE, AND DEXTROMETHORPHAN HYDROBROMIDE 2; 30; 10 MG/5ML; MG/5ML; MG/5ML
5 SYRUP ORAL 4 TIMES DAILY PRN
Qty: 118 ML | Refills: 0 | Status: SHIPPED | OUTPATIENT
Start: 2023-11-09

## 2023-11-09 ASSESSMENT — ENCOUNTER SYMPTOMS
SINUS PRESSURE: 1
VOMITING: 0
WHEEZING: 0
ABDOMINAL PAIN: 0
DIARRHEA: 0
EYE REDNESS: 0
SORE THROAT: 1
NAUSEA: 0
SHORTNESS OF BREATH: 0
COUGH: 1
BACK PAIN: 0
EYE DISCHARGE: 0
EYE PAIN: 0

## 2023-11-09 NOTE — PROGRESS NOTES
Chief Complaint:   Pharyngitis, Congestion, and Otalgia      History of Present Illness   HPI:  Flor Lara is a 66 y.o. male who presents to [address] [place] [address] [place] today for sore throat, cough, sinus and ear pressure    Prior to Visit Medications    Medication Sig Taking? Authorizing Provider   brompheniramine-pseudoephedrine-DM 2-30-10 MG/5ML syrup Take 5 mLs by mouth 4 times daily as needed for Congestion or Cough Yes Soto Hammer, DO   amoxicillin-clavulanate (AUGMENTIN) 875-125 MG per tablet Take 1 tablet by mouth 2 times daily for 10 days Yes Soto Hammer, DO   lamoTRIgine (LAMICTAL) 200 MG tablet Take 1.5 tablets by mouth daily  ProviderAngle MD   clonazePAM (KLONOPIN) 1 MG tablet Take 1 tablet by mouth nightly as needed. ProviderAngle MD       Review of Systems   Review of Systems   Constitutional:  Negative for chills and fever. HENT:  Positive for congestion, ear pain, sinus pressure and sore throat. Eyes:  Negative for pain, discharge and redness. Respiratory:  Positive for cough. Negative for shortness of breath and wheezing. Cardiovascular:  Negative for chest pain. Gastrointestinal:  Negative for abdominal pain, diarrhea, nausea and vomiting. Genitourinary:  Negative for dysuria and frequency. Musculoskeletal:  Negative for arthralgias and back pain. Skin:  Negative for rash and wound. Neurological:  Negative for weakness and headaches. Hematological:  Negative for adenopathy. Psychiatric/Behavioral: Negative. All other systems reviewed and are negative. Patient's medical, social, and family history reviewed    Past Medical History:  has a past medical history of Clinical depression and Depression. Past Surgical History:  has a past surgical history that includes Cholecystectomy and knee surgery (Left, 2000). Social History:  reports that he has never smoked.  He has never used smokeless tobacco. He reports that he does not currently use

## 2023-11-20 ENCOUNTER — OFFICE VISIT (OUTPATIENT)
Dept: FAMILY MEDICINE CLINIC | Age: 63
End: 2023-11-20
Payer: COMMERCIAL

## 2023-11-20 VITALS
BODY MASS INDEX: 31.1 KG/M2 | WEIGHT: 210.6 LBS | DIASTOLIC BLOOD PRESSURE: 78 MMHG | OXYGEN SATURATION: 98 % | SYSTOLIC BLOOD PRESSURE: 144 MMHG | TEMPERATURE: 97.5 F | HEART RATE: 83 BPM

## 2023-11-20 DIAGNOSIS — J02.0 ACUTE STREPTOCOCCAL PHARYNGITIS: Primary | ICD-10-CM

## 2023-11-20 DIAGNOSIS — J01.90 ACUTE SINUSITIS, RECURRENCE NOT SPECIFIED, UNSPECIFIED LOCATION: ICD-10-CM

## 2023-11-20 DIAGNOSIS — J02.9 SORE THROAT: ICD-10-CM

## 2023-11-20 LAB — S PYO AG THROAT QL: POSITIVE

## 2023-11-20 PROCEDURE — 99213 OFFICE O/P EST LOW 20 MIN: CPT

## 2023-11-20 PROCEDURE — 87880 STREP A ASSAY W/OPTIC: CPT | Performed by: PHYSICIAN ASSISTANT

## 2023-11-20 PROCEDURE — 96372 THER/PROPH/DIAG INJ SC/IM: CPT

## 2023-11-20 RX ORDER — CEFDINIR 300 MG/1
300 CAPSULE ORAL 2 TIMES DAILY
Qty: 20 CAPSULE | Refills: 0 | Status: SHIPPED
Start: 2023-11-20 | End: 2023-11-22

## 2023-11-20 RX ORDER — FLUOXETINE HYDROCHLORIDE 20 MG/1
CAPSULE ORAL DAILY
COMMUNITY
Start: 2023-11-14

## 2023-11-20 RX ORDER — MELOXICAM 15 MG/1
15 TABLET ORAL DAILY
COMMUNITY
Start: 2023-11-09

## 2023-11-20 RX ORDER — OLANZAPINE 5 MG/1
5 TABLET ORAL
COMMUNITY
Start: 2023-09-09

## 2023-11-20 RX ORDER — METHYLPREDNISOLONE ACETATE 80 MG/ML
80 INJECTION, SUSPENSION INTRA-ARTICULAR; INTRALESIONAL; INTRAMUSCULAR; SOFT TISSUE ONCE
Status: COMPLETED | OUTPATIENT
Start: 2023-11-20 | End: 2023-11-20

## 2023-11-20 RX ORDER — FLUOXETINE HYDROCHLORIDE 40 MG/1
CAPSULE ORAL DAILY
COMMUNITY
Start: 2023-11-14

## 2023-11-20 RX ADMIN — METHYLPREDNISOLONE ACETATE 80 MG: 80 INJECTION, SUSPENSION INTRA-ARTICULAR; INTRALESIONAL; INTRAMUSCULAR; SOFT TISSUE at 09:21

## 2023-11-22 ENCOUNTER — OFFICE VISIT (OUTPATIENT)
Dept: FAMILY MEDICINE CLINIC | Age: 63
End: 2023-11-22
Payer: COMMERCIAL

## 2023-11-22 VITALS
SYSTOLIC BLOOD PRESSURE: 136 MMHG | HEART RATE: 84 BPM | OXYGEN SATURATION: 97 % | DIASTOLIC BLOOD PRESSURE: 80 MMHG | HEIGHT: 69 IN | WEIGHT: 211 LBS | BODY MASS INDEX: 31.25 KG/M2 | TEMPERATURE: 97.8 F

## 2023-11-22 DIAGNOSIS — J02.0 STREP PHARYNGITIS: Primary | ICD-10-CM

## 2023-11-22 DIAGNOSIS — T50.905A ADVERSE EFFECT OF DRUG, INITIAL ENCOUNTER: ICD-10-CM

## 2023-11-22 PROCEDURE — 99213 OFFICE O/P EST LOW 20 MIN: CPT | Performed by: PHYSICIAN ASSISTANT

## 2023-11-22 RX ORDER — AZITHROMYCIN 500 MG/1
500 TABLET, FILM COATED ORAL DAILY
Qty: 5 TABLET | Refills: 0 | Status: SHIPPED | OUTPATIENT
Start: 2023-11-22 | End: 2023-11-27

## 2023-11-23 LAB
CULTURE: NORMAL
CULTURE: NORMAL
SPECIMEN DESCRIPTION: NORMAL

## 2024-01-08 ENCOUNTER — OFFICE VISIT (OUTPATIENT)
Dept: FAMILY MEDICINE CLINIC | Age: 64
End: 2024-01-08
Payer: COMMERCIAL

## 2024-01-08 VITALS
SYSTOLIC BLOOD PRESSURE: 132 MMHG | WEIGHT: 209.2 LBS | HEIGHT: 69 IN | TEMPERATURE: 98 F | HEART RATE: 84 BPM | DIASTOLIC BLOOD PRESSURE: 82 MMHG | OXYGEN SATURATION: 98 % | BODY MASS INDEX: 30.98 KG/M2

## 2024-01-08 DIAGNOSIS — R09.81 NASAL CONGESTION: ICD-10-CM

## 2024-01-08 DIAGNOSIS — R51.9 SINUS HEADACHE: ICD-10-CM

## 2024-01-08 DIAGNOSIS — R09.82 POSTNASAL DRIP: ICD-10-CM

## 2024-01-08 DIAGNOSIS — J04.0 ACUTE LARYNGITIS: ICD-10-CM

## 2024-01-08 DIAGNOSIS — J01.90 ACUTE NON-RECURRENT SINUSITIS, UNSPECIFIED LOCATION: Primary | ICD-10-CM

## 2024-01-08 PROCEDURE — 99213 OFFICE O/P EST LOW 20 MIN: CPT | Performed by: PHYSICIAN ASSISTANT

## 2024-01-08 RX ORDER — PREDNISONE 10 MG/1
TABLET ORAL
Qty: 18 TABLET | Refills: 0 | Status: SHIPPED | OUTPATIENT
Start: 2024-01-08

## 2024-01-08 RX ORDER — AZITHROMYCIN 250 MG/1
250 TABLET, FILM COATED ORAL SEE ADMIN INSTRUCTIONS
Qty: 6 TABLET | Refills: 0 | Status: SHIPPED | OUTPATIENT
Start: 2024-01-08 | End: 2024-01-13

## 2024-01-08 NOTE — PROGRESS NOTES
signs.  Cardiovascular: Regular Rate and Rhythm  Respiratory: No acute distress, no rhonchi, wheezing or crackles noted. No stridor or retractions are noted.  Neurological: A&O x4, normal speech  Psychiatric: Cooperative       Testing:           Medical Decision Making:     Vital signs reviewed    Past medical history reviewed.    Allergies reviewed.    Medications reviewed.    Patient on arrival does not appear to be in any apparent distress or discomfort.  The patient has been seen and evaluated.  The patient does not appear to be toxic or lethargic.     The patient will be treated with azithromycin, prednisone.    The patient will continue with decongestant, nasal spray.    We discussed doing a CT scan if symptoms or not improving.    The patient was educated on the proper dosage of motrin and tylenol and the appropriate intervals of each. The patient is to increase fluid intake over the next several days. The patient is to use OTC decongestant as needed.     The patient is to return to express care or go directly to the emergency department should any of the signs or symptoms worsen. The patient is to followup with primary care physician in 2-3 days for repeat evaluation. The patient has no other questions or concerns at this time the patient will be discharged home.      Clinical Impression:   Ivan was seen today for headache and sinus problem.    Diagnoses and all orders for this visit:    Acute non-recurrent sinusitis, unspecified location    Nasal congestion    Sinus headache    Postnasal drip    Acute laryngitis    Other orders  -     azithromycin (ZITHROMAX) 250 MG tablet; Take 1 tablet by mouth See Admin Instructions for 5 days 500mg on day 1 followed by 250mg on days 2 - 5  -     predniSONE (DELTASONE) 10 MG tablet; 3 tabs once daily for 3 days, 2 tabs once daily for 3 days, 1 tab once daily for 3 days        The patient is to call for any concerns or return if any of the signs or symptoms worsen. The

## 2024-04-15 ENCOUNTER — OFFICE VISIT (OUTPATIENT)
Dept: FAMILY MEDICINE CLINIC | Age: 64
End: 2024-04-15
Payer: COMMERCIAL

## 2024-04-15 VITALS
HEART RATE: 93 BPM | SYSTOLIC BLOOD PRESSURE: 132 MMHG | DIASTOLIC BLOOD PRESSURE: 82 MMHG | WEIGHT: 207 LBS | BODY MASS INDEX: 30.66 KG/M2 | HEIGHT: 69 IN | OXYGEN SATURATION: 98 % | TEMPERATURE: 98.2 F

## 2024-04-15 DIAGNOSIS — J06.9 ACUTE UPPER RESPIRATORY INFECTION, UNSPECIFIED: Primary | ICD-10-CM

## 2024-04-15 DIAGNOSIS — J02.9 ACUTE PHARYNGITIS, UNSPECIFIED ETIOLOGY: ICD-10-CM

## 2024-04-15 DIAGNOSIS — J01.90 ACUTE NON-RECURRENT SINUSITIS, UNSPECIFIED LOCATION: ICD-10-CM

## 2024-04-15 DIAGNOSIS — J02.9 SORE THROAT: ICD-10-CM

## 2024-04-15 LAB — S PYO AG THROAT QL: NORMAL

## 2024-04-15 PROCEDURE — 99214 OFFICE O/P EST MOD 30 MIN: CPT | Performed by: PHYSICIAN ASSISTANT

## 2024-04-15 PROCEDURE — 96372 THER/PROPH/DIAG INJ SC/IM: CPT | Performed by: PHYSICIAN ASSISTANT

## 2024-04-15 PROCEDURE — 87880 STREP A ASSAY W/OPTIC: CPT | Performed by: PHYSICIAN ASSISTANT

## 2024-04-15 RX ORDER — AZITHROMYCIN 250 MG/1
TABLET, FILM COATED ORAL
Qty: 6 TABLET | Refills: 0 | Status: SHIPPED | OUTPATIENT
Start: 2024-04-15 | End: 2024-04-25

## 2024-04-15 RX ORDER — METHYLPREDNISOLONE ACETATE 80 MG/ML
80 INJECTION, SUSPENSION INTRA-ARTICULAR; INTRALESIONAL; INTRAMUSCULAR; SOFT TISSUE ONCE
Status: COMPLETED | OUTPATIENT
Start: 2024-04-15 | End: 2024-04-15

## 2024-04-15 RX ADMIN — METHYLPREDNISOLONE ACETATE 80 MG: 80 INJECTION, SUSPENSION INTRA-ARTICULAR; INTRALESIONAL; INTRAMUSCULAR; SOFT TISSUE at 09:34

## 2024-04-15 NOTE — PROGRESS NOTES
(KLONOPIN) 1 MG tablet, Take 1 tablet by mouth nightly as needed., Disp: , Rfl:     Allergies:     Allergies   Allergen Reactions    Sulfa Antibiotics     Tamsulosin Headaches       Social History:     Social History     Tobacco Use    Smoking status: Never    Smokeless tobacco: Never   Substance Use Topics    Alcohol use: Not Currently    Drug use: Never       Patient lives at home.    Physical Exam:     Vitals:    04/15/24 0915   BP: 132/82   Pulse: 93   Temp: 98.2 °F (36.8 °C)   SpO2: 98%   Weight: 93.9 kg (207 lb)   Height: 1.753 m (5' 9.02\")       Exam:  Physical Exam  Nurse's notes and vital signs reviewed. The patient is not hypoxic.  ?  General: Alert, no acute distress, patient resting comfortably Patient is not toxic or lethargic.  Skin: Warm, intact, no pallor noted. There is no evidence of rash at this time.  Head: Normocephalic, atraumatic  Eye: Normal conjunctiva  Ears, Nose, Throat: Right tympanic membrane clear, left tympanic membrane clear. No drainage or discharge noted. No pre- or post-auricular tenderness, erythema, or swelling noted.   Nasal congestion, rhinorrhea, no epistaxis  Posterior oropharynx shows erythema and cobblestoning but no evidence of tonsillar hypertrophy, or exudate. the uvula is midline. No trismus or drooling is noted.   Moist mucous membranes.  Neck: No anterior/posterior lymphadenopathy noted. No erythema, no masses, no fluctuance or induration noted. No meningeal signs.  Cardiovascular: Regular Rate and Rhythm  Respiratory: No acute distress, no rhonchi, wheezing or crackles noted. No stridor or retractions are noted.  Neurological: A&O x4, normal speech  Psychiatric: Cooperative       Testing:     Results for orders placed or performed in visit on 04/15/24   POCT rapid strep A   Result Value Ref Range    Strep A Ag None Detected None Detected           Medical Decision Making:     Vital signs reviewed    Past medical history reviewed.    Allergies reviewed.    Medications

## 2024-10-26 ENCOUNTER — OFFICE VISIT (OUTPATIENT)
Dept: FAMILY MEDICINE CLINIC | Age: 64
End: 2024-10-26

## 2024-10-26 VITALS
TEMPERATURE: 98.3 F | DIASTOLIC BLOOD PRESSURE: 78 MMHG | HEIGHT: 69 IN | OXYGEN SATURATION: 97 % | SYSTOLIC BLOOD PRESSURE: 126 MMHG | BODY MASS INDEX: 31.1 KG/M2 | WEIGHT: 210 LBS | HEART RATE: 81 BPM

## 2024-10-26 DIAGNOSIS — R09.81 NASAL CONGESTION: ICD-10-CM

## 2024-10-26 DIAGNOSIS — U07.1 COVID-19: ICD-10-CM

## 2024-10-26 DIAGNOSIS — J02.9 SORE THROAT: Primary | ICD-10-CM

## 2024-10-26 LAB
INFLUENZA A ANTIBODY: NORMAL
INFLUENZA B ANTIBODY: NORMAL
Lab: ABNORMAL
PERFORMING INSTRUMENT: ABNORMAL
QC PASS/FAIL: ABNORMAL
SARS-COV-2, POC: DETECTED

## 2024-10-26 RX ORDER — METHYLPREDNISOLONE ACETATE 40 MG/ML
40 INJECTION, SUSPENSION INTRA-ARTICULAR; INTRALESIONAL; INTRAMUSCULAR; SOFT TISSUE ONCE
Status: COMPLETED | OUTPATIENT
Start: 2024-10-26 | End: 2024-10-26

## 2024-10-26 RX ORDER — AZITHROMYCIN 250 MG/1
TABLET, FILM COATED ORAL
Qty: 1 PACKET | Refills: 0 | Status: SHIPPED | OUTPATIENT
Start: 2024-10-26

## 2024-10-26 RX ADMIN — METHYLPREDNISOLONE ACETATE 40 MG: 40 INJECTION, SUSPENSION INTRA-ARTICULAR; INTRALESIONAL; INTRAMUSCULAR; SOFT TISSUE at 08:31

## 2024-10-26 ASSESSMENT — ENCOUNTER SYMPTOMS
ALLERGIC/IMMUNOLOGIC NEGATIVE: 1
EYES NEGATIVE: 1
GASTROINTESTINAL NEGATIVE: 1
RESPIRATORY NEGATIVE: 1

## 2024-10-26 NOTE — PROGRESS NOTES
10/26/24     Ivan Ornelas    : 1960 Sex: male   Age: 64 y.o.      Chief Complaint   Patient presents with    Sore Throat     X5d    Congestion    Shortness of Breath       Prior to Admission medications    Medication Sig Start Date End Date Taking? Authorizing Provider   azithromycin (ZITHROMAX Z-IDRIS) 250 MG tablet 2 today  Then 1 qd  4 days 10/26/24  Yes Rosendo Cloud,    FLUoxetine (PROZAC) 20 MG capsule Take by mouth daily 23  Yes ProviderAngle MD   FLUoxetine (PROZAC) 40 MG capsule Take by mouth daily 23  Yes Provider, MD Angle   lamoTRIgine (LAMICTAL) 200 MG tablet Take 1.5 tablets by mouth daily   Yes ProviderAngle MD   clonazePAM (KLONOPIN) 1 MG tablet Take 1 tablet by mouth nightly as needed.   Yes Provider, MD Angle          HPI: Patient evaluated sore throat upper respiratory congestion body aches with onset this past Monday.  Testing positive for COVID in the office today.  I cautioned him with proper isolation at home family members and work.  5 to 7-day quarantine.  We are going to treat with a Z-Idris some prednisone with his current sinus difficulties but do not feel great need for Paxlovid at this time.          Review of Systems   Constitutional:  Positive for fatigue.   HENT:  Positive for congestion.    Eyes: Negative.    Respiratory: Negative.     Gastrointestinal: Negative.    Endocrine: Negative.    Genitourinary: Negative.    Musculoskeletal: Negative.    Skin: Negative.    Allergic/Immunologic: Negative.    Neurological: Negative.    Hematological: Negative.    Psychiatric/Behavioral: Negative.                Current Outpatient Medications:     azithromycin (ZITHROMAX Z-IDRIS) 250 MG tablet, 2 today  Then 1 qd  4 days, Disp: 1 packet, Rfl: 0    FLUoxetine (PROZAC) 20 MG capsule, Take by mouth daily, Disp: , Rfl:     FLUoxetine (PROZAC) 40 MG capsule, Take by mouth daily, Disp: , Rfl:     lamoTRIgine (LAMICTAL) 200 MG tablet, Take 1.5

## 2024-11-14 ENCOUNTER — OFFICE VISIT (OUTPATIENT)
Dept: FAMILY MEDICINE CLINIC | Age: 64
End: 2024-11-14
Payer: COMMERCIAL

## 2024-11-14 VITALS
TEMPERATURE: 98.2 F | OXYGEN SATURATION: 97 % | BODY MASS INDEX: 31.4 KG/M2 | DIASTOLIC BLOOD PRESSURE: 62 MMHG | HEIGHT: 69 IN | RESPIRATION RATE: 18 BRPM | HEART RATE: 87 BPM | WEIGHT: 212 LBS | SYSTOLIC BLOOD PRESSURE: 124 MMHG

## 2024-11-14 DIAGNOSIS — J02.9 SORE THROAT: ICD-10-CM

## 2024-11-14 DIAGNOSIS — J02.0 ACUTE STREPTOCOCCAL PHARYNGITIS: Primary | ICD-10-CM

## 2024-11-14 LAB — S PYO AG THROAT QL: POSITIVE

## 2024-11-14 PROCEDURE — 87880 STREP A ASSAY W/OPTIC: CPT

## 2024-11-14 PROCEDURE — 99213 OFFICE O/P EST LOW 20 MIN: CPT

## 2024-11-14 RX ORDER — AZITHROMYCIN 500 MG/1
500 TABLET, FILM COATED ORAL DAILY
Qty: 5 TABLET | Refills: 0 | Status: SHIPPED | OUTPATIENT
Start: 2024-11-14 | End: 2024-11-19

## 2024-11-14 NOTE — PROGRESS NOTES
Chief Complaint       Pharyngitis      History of Present Illness   Source of history provided by:  patient.     Ivan Ornelas is a 64 y.o. old male presenting to the walk in clinic for evaluation of sore throat x 1 days. Reports associated pain with swallowing and increased fatigue.  Denies any fever, chills, loss of taste/smell, dyspnea, dysphagia, CP, SOB, cough, nausea, vomiting, rash, or lethargy.  Reports possible exposure to strep throat from his partner's children.    ROS    Unless otherwise stated in this report or unable to obtain because of the patient's clinical or mental status as evidenced by the medical record, this patients's positive and negative responses for Review of Systems, constitutional, psych, eyes, ENT, cardiovascular, respiratory, gastrointestinal, neurological, genitourinary, musculoskeletal, integument systems and systems related to the presenting problem are either stated in the preceding or were not pertinent or were negative for the symptoms and/or complaints related to the medical problem.    Physical Exam         VS:  /62   Pulse 87   Temp 98.2 °F (36.8 °C)   Resp 18   Ht 1.753 m (5' 9.02\")   Wt 96.2 kg (212 lb)   SpO2 97%   BMI 31.29 kg/m²    Oxygen Saturation Interpretation: Normal.    Constitutional:  Alert, development consistent with age.  Ears:  TMs translucent without perforation, injection, or bulging.  External canals clear without swelling or exudate.  Throat: Airway patent.  Posterior pharynx with moderate erythema and 1+ bilateral tonsillar hypertrophy.  No exudate noted.    Neck:  Supple with full ROM. There is anterior bilateral adenopathy.    Lungs:  Clear to auscultation and breath sounds equal.    CV: Regular rate and rhythm, normal heart sounds, without pathological murmurs, ectopy, gallops, or rubs.  Skin:  No rashes, erythema present.  Lymphatics: No lymphangitis or adenopathy noted other then stated above.  Neurological:  Alert and

## 2024-11-25 PROBLEM — J02.9 SORE THROAT: Status: RESOLVED | Noted: 2024-10-26 | Resolved: 2024-11-25

## 2025-02-11 ENCOUNTER — OFFICE VISIT (OUTPATIENT)
Dept: FAMILY MEDICINE CLINIC | Age: 65
End: 2025-02-11

## 2025-02-11 VITALS
RESPIRATION RATE: 16 BRPM | BODY MASS INDEX: 30.96 KG/M2 | OXYGEN SATURATION: 98 % | SYSTOLIC BLOOD PRESSURE: 132 MMHG | DIASTOLIC BLOOD PRESSURE: 78 MMHG | HEIGHT: 69 IN | HEART RATE: 87 BPM | WEIGHT: 209 LBS | TEMPERATURE: 98.2 F

## 2025-02-11 DIAGNOSIS — J02.0 STREP PHARYNGITIS: Primary | ICD-10-CM

## 2025-02-11 DIAGNOSIS — J02.9 SORE THROAT: ICD-10-CM

## 2025-02-11 LAB — S PYO AG THROAT QL: POSITIVE

## 2025-02-11 RX ORDER — METHYLPREDNISOLONE ACETATE 80 MG/ML
80 INJECTION, SUSPENSION INTRA-ARTICULAR; INTRALESIONAL; INTRAMUSCULAR; SOFT TISSUE ONCE
Status: COMPLETED | OUTPATIENT
Start: 2025-02-11 | End: 2025-02-11

## 2025-02-11 RX ORDER — AZITHROMYCIN 500 MG/1
500 TABLET, FILM COATED ORAL DAILY
Qty: 5 TABLET | Refills: 0 | Status: SHIPPED | OUTPATIENT
Start: 2025-02-11 | End: 2025-02-16

## 2025-02-11 RX ADMIN — METHYLPREDNISOLONE ACETATE 80 MG: 80 INJECTION, SUSPENSION INTRA-ARTICULAR; INTRALESIONAL; INTRAMUSCULAR; SOFT TISSUE at 08:13

## 2025-02-11 NOTE — PROGRESS NOTES
25  Ivan Ornelas : 1960 Sex: male  Age 64 y.o.      Subjective:  Chief Complaint   Patient presents with    Pharyngitis    Congestion         HPI:     History of Present Illness  The patient is a 64-year-old male who presents for evaluation of a sore throat.    He reports experiencing a sore throat and ear discomfort persisting for approximately 8 days, with a recent exacerbation of symptoms. He has also observed a slight elevation in temperature and the onset of chills within the past day. He expresses a preference for Z-Idris as his treatment regimen. He reports no known exposure to sick individuals in his vicinity.      ROS:   Unless otherwise stated in this report the patient's positive and negative responses for review of systems for constitutional, eyes, ENT, cardiovascular, respiratory, gastrointestinal, neurological, , musculoskeletal, and integument systems and related systems to the presenting problem are either stated in the history of present illness or were not pertinent or were negative for the symptoms and/or complaints related to the presenting medical problem.  Positives and pertinent negatives as per HPI.  All others reviewed and are negative.      PMH:     Past Medical History:   Diagnosis Date    Clinical depression     Depression        Past Surgical History:   Procedure Laterality Date    CHOLECYSTECTOMY      KNEE SURGERY Left        History reviewed. No pertinent family history.    Medications:     Current Outpatient Medications:     azithromycin (ZITHROMAX) 500 MG tablet, Take 1 tablet by mouth daily for 5 days, Disp: 5 tablet, Rfl: 0    FLUoxetine (PROZAC) 20 MG capsule, Take by mouth daily, Disp: , Rfl:     FLUoxetine (PROZAC) 40 MG capsule, Take by mouth daily, Disp: , Rfl:     lamoTRIgine (LAMICTAL) 200 MG tablet, Take 1.5 tablets by mouth daily, Disp: , Rfl:     clonazePAM (KLONOPIN) 1 MG tablet, Take 1 tablet by mouth nightly as needed., Disp: , Rfl:

## 2025-02-17 ENCOUNTER — OFFICE VISIT (OUTPATIENT)
Dept: FAMILY MEDICINE CLINIC | Age: 65
End: 2025-02-17
Payer: COMMERCIAL

## 2025-02-17 VITALS
RESPIRATION RATE: 16 BRPM | OXYGEN SATURATION: 98 % | DIASTOLIC BLOOD PRESSURE: 78 MMHG | HEART RATE: 78 BPM | HEIGHT: 69 IN | SYSTOLIC BLOOD PRESSURE: 128 MMHG | BODY MASS INDEX: 30.81 KG/M2 | TEMPERATURE: 98.1 F | WEIGHT: 208 LBS

## 2025-02-17 DIAGNOSIS — J02.9 SORE THROAT: ICD-10-CM

## 2025-02-17 DIAGNOSIS — J02.0 STREP PHARYNGITIS: Primary | ICD-10-CM

## 2025-02-17 LAB — S PYO AG THROAT QL: POSITIVE

## 2025-02-17 PROCEDURE — 96372 THER/PROPH/DIAG INJ SC/IM: CPT | Performed by: PHYSICIAN ASSISTANT

## 2025-02-17 PROCEDURE — 87880 STREP A ASSAY W/OPTIC: CPT | Performed by: PHYSICIAN ASSISTANT

## 2025-02-17 PROCEDURE — 3028F O2 SATURATION DOC REV: CPT | Performed by: PHYSICIAN ASSISTANT

## 2025-02-17 PROCEDURE — 99214 OFFICE O/P EST MOD 30 MIN: CPT | Performed by: PHYSICIAN ASSISTANT

## 2025-02-17 RX ORDER — METHYLPREDNISOLONE ACETATE 80 MG/ML
80 INJECTION, SUSPENSION INTRA-ARTICULAR; INTRALESIONAL; INTRAMUSCULAR; SOFT TISSUE ONCE
Status: COMPLETED | OUTPATIENT
Start: 2025-02-17 | End: 2025-02-17

## 2025-02-17 RX ADMIN — METHYLPREDNISOLONE ACETATE 80 MG: 80 INJECTION, SUSPENSION INTRA-ARTICULAR; INTRALESIONAL; INTRAMUSCULAR; SOFT TISSUE at 08:10

## 2025-02-17 NOTE — PROGRESS NOTES
25  Ivan Ornelas : 1960 Sex: male  Age 64 y.o.      Subjective:  Chief Complaint   Patient presents with    Pharyngitis         HPI:     History of Present Illness  The patient is a 64-year-old male who presents for evaluation of pharyngitis.    He reports persistent pharyngeal discomfort, which he describes as a dull sensation. He experiences dysphagia, particularly on the left side. He has not experienced any episodes of fever, vomiting, diarrhea, or significant coughing. He completed a course of Z-Idris two days prior.  The patient is not really having any congestion.  The patient is able to eat and drink.  The patient really does not have any trismus noted.  The patient is not drooling.    MEDICATIONS  Completed Z-Idris 2 days ago            ROS:   Unless otherwise stated in this report the patient's positive and negative responses for review of systems for constitutional, eyes, ENT, cardiovascular, respiratory, gastrointestinal, neurological, , musculoskeletal, and integument systems and related systems to the presenting problem are either stated in the history of present illness or were not pertinent or were negative for the symptoms and/or complaints related to the presenting medical problem.  Positives and pertinent negatives as per HPI.  All others reviewed and are negative.      PMH:     Past Medical History:   Diagnosis Date    Clinical depression     Depression        Past Surgical History:   Procedure Laterality Date    CHOLECYSTECTOMY      KNEE SURGERY Left        History reviewed. No pertinent family history.    Medications:     Current Outpatient Medications:     amoxicillin-clavulanate (AUGMENTIN) 875-125 MG per tablet, Take 1 tablet by mouth 2 times daily for 10 days, Disp: 20 tablet, Rfl: 0    FLUoxetine (PROZAC) 20 MG capsule, Take by mouth daily, Disp: , Rfl:     FLUoxetine (PROZAC) 40 MG capsule, Take by mouth daily, Disp: , Rfl:     lamoTRIgine (LAMICTAL) 200 MG tablet,

## 2025-05-17 ENCOUNTER — OFFICE VISIT (OUTPATIENT)
Dept: FAMILY MEDICINE CLINIC | Age: 65
End: 2025-05-17
Payer: COMMERCIAL

## 2025-05-17 VITALS
WEIGHT: 199 LBS | OXYGEN SATURATION: 98 % | RESPIRATION RATE: 18 BRPM | TEMPERATURE: 98.3 F | HEIGHT: 69 IN | DIASTOLIC BLOOD PRESSURE: 78 MMHG | SYSTOLIC BLOOD PRESSURE: 122 MMHG | BODY MASS INDEX: 29.47 KG/M2 | HEART RATE: 85 BPM

## 2025-05-17 DIAGNOSIS — J02.9 SORE THROAT: Primary | ICD-10-CM

## 2025-05-17 DIAGNOSIS — J02.0 ACUTE STREPTOCOCCAL PHARYNGITIS: ICD-10-CM

## 2025-05-17 LAB — S PYO AG THROAT QL: POSITIVE

## 2025-05-17 PROCEDURE — 87880 STREP A ASSAY W/OPTIC: CPT | Performed by: FAMILY MEDICINE

## 2025-05-17 PROCEDURE — 99213 OFFICE O/P EST LOW 20 MIN: CPT | Performed by: FAMILY MEDICINE

## 2025-05-17 RX ORDER — OLANZAPINE 5 MG/1
5 TABLET, FILM COATED ORAL NIGHTLY
COMMUNITY
Start: 2025-03-06

## 2025-05-17 ASSESSMENT — ENCOUNTER SYMPTOMS
VOMITING: 0
COUGH: 0
EYE PAIN: 0
CONSTIPATION: 0
WHEEZING: 0
BACK PAIN: 0
SHORTNESS OF BREATH: 0
NAUSEA: 0
SINUS PAIN: 0
SORE THROAT: 1
ABDOMINAL PAIN: 0
SINUS PRESSURE: 1
DIARRHEA: 0

## 2025-05-17 NOTE — PROGRESS NOTES
Ivan Ornelas (:  1960) is a 64 y.o. male,Established patient, here for evaluation of the following chief complaint(s):  Pharyngitis         Assessment & Plan  Sore throat   Rapid strep positive    Orders:    POCT rapid strep A    Acute streptococcal pharyngitis    Augmentin bid x 10 days, flonase and advil as needed, f/u if not getting better in a few days, really should f/u w/ pcp for ent referral    Orders:    amoxicillin-clavulanate (AUGMENTIN) 875-125 MG per tablet; Take 1 tablet by mouth 2 times daily for 10 days      Return if symptoms worsen or fail to improve.       Subjective   Here with sore throat and head ocngestion since Monday  Just getting owrse, taking advil cold and sinus but throat is not getting better  Multiple streps in the past  Has not seen pcp for this  No fevers        Review of Systems   Constitutional:  Negative for appetite change, fatigue and fever.   HENT:  Positive for congestion, postnasal drip, sinus pressure and sore throat. Negative for ear pain, hearing loss and sinus pain.    Eyes:  Negative for pain.   Respiratory:  Negative for cough, shortness of breath and wheezing.    Cardiovascular:  Negative for chest pain and leg swelling.   Gastrointestinal:  Negative for abdominal pain, constipation, diarrhea, nausea and vomiting.   Endocrine: Negative for cold intolerance and heat intolerance.   Genitourinary:  Negative for difficulty urinating, hematuria, scrotal swelling, testicular pain and urgency.   Musculoskeletal:  Negative for arthralgias, back pain, joint swelling and myalgias.   Skin:  Negative for rash and wound.   Neurological:  Negative for dizziness, syncope and light-headedness.   Hematological:  Negative for adenopathy.   Psychiatric/Behavioral:  Negative for confusion and sleep disturbance. The patient is not nervous/anxious.           Objective   Physical Exam  Vitals and nursing note reviewed.   Constitutional:       General: He is not in acute

## 2025-06-05 ENCOUNTER — OFFICE VISIT (OUTPATIENT)
Dept: FAMILY MEDICINE CLINIC | Age: 65
End: 2025-06-05
Payer: COMMERCIAL

## 2025-06-05 VITALS
SYSTOLIC BLOOD PRESSURE: 112 MMHG | TEMPERATURE: 96.8 F | HEIGHT: 69 IN | DIASTOLIC BLOOD PRESSURE: 68 MMHG | RESPIRATION RATE: 16 BRPM | WEIGHT: 198 LBS | OXYGEN SATURATION: 97 % | BODY MASS INDEX: 29.33 KG/M2 | HEART RATE: 66 BPM

## 2025-06-05 DIAGNOSIS — J02.9 SORE THROAT: ICD-10-CM

## 2025-06-05 DIAGNOSIS — J02.0 ACUTE STREPTOCOCCAL PHARYNGITIS: Primary | ICD-10-CM

## 2025-06-05 LAB — S PYO AG THROAT QL: POSITIVE

## 2025-06-05 PROCEDURE — 87880 STREP A ASSAY W/OPTIC: CPT

## 2025-06-05 PROCEDURE — 99214 OFFICE O/P EST MOD 30 MIN: CPT

## 2025-06-05 PROCEDURE — 96372 THER/PROPH/DIAG INJ SC/IM: CPT

## 2025-06-05 RX ORDER — CEFDINIR 300 MG/1
300 CAPSULE ORAL EVERY 12 HOURS
Qty: 20 CAPSULE | Refills: 0 | Status: SHIPPED | OUTPATIENT
Start: 2025-06-05 | End: 2025-06-15

## 2025-06-05 RX ORDER — DEXAMETHASONE SODIUM PHOSPHATE 10 MG/ML
10 INJECTION, SOLUTION INTRA-ARTICULAR; INTRALESIONAL; INTRAMUSCULAR; INTRAVENOUS; SOFT TISSUE ONCE
Status: COMPLETED | OUTPATIENT
Start: 2025-06-05 | End: 2025-06-05

## 2025-06-05 RX ADMIN — DEXAMETHASONE SODIUM PHOSPHATE 10 MG: 10 INJECTION, SOLUTION INTRA-ARTICULAR; INTRALESIONAL; INTRAMUSCULAR; INTRAVENOUS; SOFT TISSUE at 08:42

## 2025-06-08 LAB
CULTURE: NORMAL
CULTURE: NORMAL
SPECIMEN DESCRIPTION: NORMAL

## 2025-06-09 ENCOUNTER — RESULTS FOLLOW-UP (OUTPATIENT)
Dept: FAMILY MEDICINE CLINIC | Age: 65
End: 2025-06-09